# Patient Record
Sex: MALE | Race: WHITE | Employment: FULL TIME | URBAN - METROPOLITAN AREA
[De-identification: names, ages, dates, MRNs, and addresses within clinical notes are randomized per-mention and may not be internally consistent; named-entity substitution may affect disease eponyms.]

---

## 2024-11-13 ENCOUNTER — PATIENT MESSAGE (OUTPATIENT)
Dept: SURGERY | Facility: CLINIC | Age: 39
End: 2024-11-13

## 2024-11-21 ENCOUNTER — OFFICE VISIT (OUTPATIENT)
Dept: SURGERY | Facility: CLINIC | Age: 39
End: 2024-11-21

## 2024-11-21 ENCOUNTER — PATIENT MESSAGE (OUTPATIENT)
Dept: SURGERY | Facility: CLINIC | Age: 39
End: 2024-11-21

## 2024-11-21 DIAGNOSIS — F52.4 PREMATURE EJACULATION: ICD-10-CM

## 2024-11-21 DIAGNOSIS — N52.9 ERECTILE DYSFUNCTION, UNSPECIFIED ERECTILE DYSFUNCTION TYPE: Primary | ICD-10-CM

## 2024-11-21 DIAGNOSIS — R21 SCROTAL RASH: ICD-10-CM

## 2024-11-21 PROCEDURE — 99204 OFFICE O/P NEW MOD 45 MIN: CPT

## 2024-11-21 RX ORDER — SILDENAFIL 100 MG/1
100 TABLET, FILM COATED ORAL
Qty: 30 TABLET | Refills: 5 | Status: SHIPPED | OUTPATIENT
Start: 2024-11-21

## 2024-11-21 RX ORDER — TADALAFIL 20 MG/1
20 TABLET ORAL
Qty: 30 TABLET | Refills: 5 | Status: SHIPPED | OUTPATIENT
Start: 2024-11-21

## 2024-11-21 RX ORDER — SILDENAFIL 50 MG/1
1 TABLET, FILM COATED ORAL AS NEEDED
COMMUNITY
Start: 2023-11-02 | End: 2024-11-21

## 2024-11-21 NOTE — PROGRESS NOTES
[facility], [facility]    Urology Consult Note    History of Present Illness:   Patient is a(n) 39 year old male with hx of WPW who presents for sexual health consult.    Pt recently having ED issues with achieving and maintaining. More so maintaining. Has been taking sildenafil 50mg prn with good results. Has been  to wife of 2yrs. Recently opened up relationship and girlfriend has moved in. Denies any stressors pertaining to this situation.     Wife and girlfriend both urged him to get seen for ongoing ED issues. Feels he is not able to maintain erections as long and ejaculates quickly although denies ejaculation before erections. Feels ejaculation occurs a few minutes after activity begins.     Also questions about increasing penis girth and size. Notes some discoloration to scrotum a couple wks ago. Improved since.     No urinary complaints. No fatigue complaints. No issues with libido.     T levels 10/27/23 959am at 319    HISTORY:  Past Medical History:    True-Parkinson-White (WPW) syndrome      No past surgical history on file.   No family history on file.   Social History:   Social History     Socioeconomic History    Marital status: Single   Tobacco Use    Smoking status: Never    Smokeless tobacco: Never   Substance and Sexual Activity    Alcohol use: Never        Allergies  Allergies[1]    Review of Systems:   A 10-point review of systems was completed and is negative other than as noted above.    Physical Exam:   There were no vitals taken for this visit.    GENERAL APPEARANCE: no acute distress  NEUROLOGIC: converses appropriately  HEAD: atraumatic, normocephalic  LUNGS: non-labored breathing  ABDOMEN: soft, nontender, non-distended  BACK: no CVA tenderness  PSYCH: appropriate affect and mood  INGUINAL CANALS: no hernias  PENILE MEATUS: open and in normal location  PENIS: normal  SCROTUM: normal, no varicocele  TESTES: normal anatomy  EPIDIDYMIS: normal  anatomy      Results:     Laboratory Data:  Lab Results   Component Value Date    WBC 7.61 03/16/2020    HGB 16.5 03/16/2020     03/16/2020     Lab Results   Component Value Date     03/16/2020    K 4.20 03/16/2020     03/16/2020    CO2 29.7 03/16/2020    BUN 20.0 03/16/2020     (H) 03/16/2020    AST 24 03/16/2020    ALT 37 03/16/2020    TP 7.6 03/16/2020    ALB 3.9 03/16/2020       Urinalysis Results (last 3 years):  No results for input(s)  in the last 3 years    Urine Culture Results (last 3 years):  No results found for: \"URINECUL\"    Imaging  No results found.      Impression:   Recommendations:  ED  - discussed nature of sexual dysfunction with pt and reviewed cause of ED can be multifactorial including vascular, diabetes, endocrine, psychologic, medication, and iatrogenic causes. Therefore, pt should maintain healthy BP, cholesterol, and blood sugars.  - encouraged to exercise as able and promoted healthy diet.  - dicussed possibility of oral medication, vacuum erectile device, intracavernosal injections, and inflatable penile prosthesis.  - given that sildenafil continues to work for him, we discussed continuing this vs. Beginning cialis since cialis may give him longer effects with longer half life and he would like to try this  - would advise against trimix currently as po options are working  - reviewed recent T labs which were WNL    Premature ejaculation  - reviewed educational materials for premature ejaculation  - discussed ~ 30% incidence of premature ejaculation in males and both behavioral techniques (including squeeze technique, start-stop technique, quiet vagina, encouraging partner stimulation/foreplay) and other options such as condoms, climax-controlled condoms and medications such as PDE5 inhibitors and SSRIs to delay ejaculation. We discussed the risks and benefits and possible side effects to these options.  - we discussed he likely does not have true PE and given  ongoing ED issues, SSRI may actually worsen ED. He is agreeable to hold off and try exercises first    Penile enhancement  - discussed likely surgical for this and could refer out     Scrotal rash  - normal exam- would defer to derm next time he feels flare up   - offered STI panel but he declines    Thank you very much for this consult. Please call if there are any questions or concerns.     Tiffany Webb PA-C  Urology  [facility]  Phone: [phone]    Date: 11/21/2024  Time: 12:58 PM         [1] No Known Allergies

## 2024-11-26 ENCOUNTER — TELEPHONE (OUTPATIENT)
Dept: SURGERY | Facility: CLINIC | Age: 39
End: 2024-11-26

## 2024-11-26 NOTE — TELEPHONE ENCOUNTER
RN initiated the prior auth [facility]

## 2025-01-13 ENCOUNTER — PATIENT MESSAGE (OUTPATIENT)
Dept: SURGERY | Facility: CLINIC | Age: 40
End: 2025-01-13

## 2025-01-28 ENCOUNTER — PATIENT MESSAGE (OUTPATIENT)
Dept: SURGERY | Facility: CLINIC | Age: 40
End: 2025-01-28

## 2025-01-30 RX ORDER — SILDENAFIL 100 MG/1
100 TABLET, FILM COATED ORAL
Qty: 30 TABLET | Refills: 5 | Status: SHIPPED | OUTPATIENT
Start: 2025-01-30

## 2025-02-27 ENCOUNTER — PATIENT MESSAGE (OUTPATIENT)
Dept: SURGERY | Facility: CLINIC | Age: 40
End: 2025-02-27

## 2025-03-22 ENCOUNTER — PATIENT MESSAGE (OUTPATIENT)
Dept: SURGERY | Facility: CLINIC | Age: 40
End: 2025-03-22